# Patient Record
Sex: MALE | NOT HISPANIC OR LATINO
[De-identification: names, ages, dates, MRNs, and addresses within clinical notes are randomized per-mention and may not be internally consistent; named-entity substitution may affect disease eponyms.]

---
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
## 2025-02-20 ENCOUNTER — EMERGENCY (EMERGENCY)
Facility: HOSPITAL | Age: 20
LOS: 0 days | Discharge: HOME | End: 2025-02-20
Attending: STUDENT IN AN ORGANIZED HEALTH CARE EDUCATION/TRAINING PROGRAM
Payer: COMMERCIAL

## 2025-02-20 VITALS
WEIGHT: 179.9 LBS | DIASTOLIC BLOOD PRESSURE: 63 MMHG | HEART RATE: 59 BPM | TEMPERATURE: 99 F | SYSTOLIC BLOOD PRESSURE: 113 MMHG | OXYGEN SATURATION: 98 % | RESPIRATION RATE: 17 BRPM
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
## 2025-02-20 DIAGNOSIS — S80.01XA CONTUSION OF RIGHT KNEE, INITIAL ENCOUNTER: ICD-10-CM

## 2025-02-20 DIAGNOSIS — S80.221A BLISTER (NONTHERMAL), RIGHT KNEE, INITIAL ENCOUNTER: ICD-10-CM
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
## 2025-02-20 PROCEDURE — 99284 EMERGENCY DEPT VISIT MOD MDM: CPT

## 2025-02-20 PROCEDURE — 96372 THER/PROPH/DIAG INJ SC/IM: CPT

## 2025-02-20 PROCEDURE — 99283 EMERGENCY DEPT VISIT LOW MDM: CPT | Mod: 25

## 2025-02-20 PROCEDURE — 73562 X-RAY EXAM OF KNEE 3: CPT | Mod: LT

## 2025-02-20 PROCEDURE — 73562 X-RAY EXAM OF KNEE 3: CPT | Mod: 26,LT

## 2025-02-20 RX ORDER — METHOCARBAMOL 500 MG
2 TABLET ORAL
Qty: 12 | Refills: 0
Start: 2025-02-20 | End: 2025-02-21

## 2025-02-20 RX ORDER — IBUPROFEN 600 MG/1
1 TABLET, FILM COATED ORAL
Qty: 40 | Refills: 0
Start: 2025-02-20 | End: 2025-03-01

## 2025-02-20 RX ORDER — KETOROLAC TROMETHAMINE 10 MG
30 TABLET ORAL ONCE
Refills: 0 | Status: DISCONTINUED | OUTPATIENT
Start: 2025-02-20 | End: 2025-02-20

## 2025-02-20 RX ADMIN — Medication 30 MILLIGRAM(S): at 02:21

## 2025-02-20 NOTE — ED PROVIDER NOTE - PATIENT PORTAL LINK FT
You can access the FollowMyHealth Patient Portal offered by [facility] by registering at the following website: [email or web address]. By joining [facility]’s FollowMyHealth portal, you will also be able to view your health information using other applications (apps) compatible with our system.

## 2025-02-20 NOTE — ED PROVIDER NOTE - OBJECTIVE STATEMENT
20yoM without significant pmhx who presents for fall on left knee yesterday. States he was in an area with snow/ice and had mechanical fall landing on knee. Since then noticed yellowish/dark bruising to knee with pain. Has been icing the knee although noticed ice causing burn with blisters after application. Still can ambulate without difficulty. Denies any numbness, weakness, tingling, significant swelling.

## 2025-02-20 NOTE — ED ADULT NURSE NOTE - NSFALLUNIVINTERV_ED_ALL_ED
Bed/Stretcher in lowest position, wheels locked, appropriate side rails in place/Call bell, personal items and telephone in reach/Instruct patient to call for assistance before getting out of bed/chair/stretcher/Non-slip footwear applied when patient is off stretcher/[place] to call system/Physically safe environment - no spills, clutter or unnecessary equipment/Purposeful proactive rounding/Room/bathroom lighting operational, light cord in reach

## 2025-02-20 NOTE — ED PROVIDER NOTE - ATTENDING CONTRIBUTION TO CARE
19 yo m no pmh  pt here for eval of L knee pain. pt had slip and fall on L knee 3 days ago. no head/neck injury. pt still w/ pain w/ certain movements prompting eval. pt ambulatory.  pt applied cold pack to knee and developed cold contact burn.  no hip/ankle pain. no neck pain    vss  gen- NAD, aaox3  card-rrr  lungs-ctab, no wheezing or rhonchi  abd-sntnd, no guarding or rebound  neuro- full str/sensation, cn ii-xii grossly intact, normal coordination   msk- FROM to L hip/knee/ankle, no joint laxity to valgus/varus, ant/post drawer.  mild anterior knee tenderness, rectangular area of skin discoloration w/ mild blistering w/o surrounding redness

## 2025-02-20 NOTE — ED PROVIDER NOTE - NSFOLLOWUPINSTRUCTIONS_ED_ALL_ED_FT
Orthopedic referral been placed.  Our Emergency Department Referral Coordinators will be reaching out to you in the next 24-48 hours from 9:00am to 5:00pm with a follow up appointment. Please expect a phone call from the hospital in that time frame. If you do not receive a call or if you have any questions or concerns, you can reach them at   [phone]    Your x-ray of the knee is reassuring.    DISCHARGE INSTRUCTIONS:  Medicines:  •NSAIDs: These medicines decrease swelling and pain. Take as directed. NSAIDs can cause stomach bleeding and kidney problems if not taken correctly.    •Rest: You will need to rest your foot for 1 to 2 days after your injury. This will help decrease the risk of more damage.  •Ice: Ice helps decrease swelling and pain. Ice may also help prevent tissue damage. Use an ice pack, or put crushed ice in a plastic bag. Cover it with a towel and place it on your foot for 15 to 20 minutes every hour or as directed.  •Compression: Compression (tight hold) provides support and helps decrease swelling and movement so your foot can heal. You may be told to keep your foot wrapped with a tight elastic bandage. Follow instructions about how to apply your bandage. Do not massage your foot. You could cause more damage or pain.  •Elevation: Keep your foot raised above the level of your heart while you are sitting or lying down. This will help decrease or limit swelling. Use pillows, blankets, or rolled towels to elevate your foot comfortably.

## 2025-02-20 NOTE — ED PROVIDER NOTE - PHYSICAL EXAMINATION
Initial vital signs reviewed.  General: NAD, nontoxic appearing.  HENT: AT/NC.  Eyes: non-injected conjunctivae b/l.  Neck: supple.  CV: Regular rate.  Pulm: nonlabored work of breathing  Ext: right knee with mild ecchymosis, negative lachman, negative valgus/varus. right proximal tibia/anterior knee with superficial contact frost burn with small area of roofed blister, ttp. full aROM of knee. 2+ popliteal, PT, DP pulses. Ambulate steady gait.  Neuro: A&Ox4.  Psych: appropriate mood and affect.

## 2025-02-20 NOTE — ED PROVIDER NOTE - CLINICAL SUMMARY MEDICAL DECISION MAKING FREE TEXT BOX
Throughout ED observation period, pt remained clinically and hemodynamically stable.  Imaging without acute fracture or dislocation.  Juan PERKINS- ED Attending, interpreted the -knee-xray: no fx or dislocation  Home care discussed- MARY KAPOOR pain management, need for follow up with ortho  indications to return to ED (increasing pain, swelling, redness, numbness, tingling)  Pt had opportunity to ask questions

## 2025-02-22 ENCOUNTER — APPOINTMENT (OUTPATIENT)
Dept: ORTHOPEDIC SURGERY | Facility: CLINIC | Age: 20
End: 2025-02-22
Payer: COMMERCIAL

## 2025-02-22 VITALS — HEIGHT: 68 IN | WEIGHT: 180 LBS | BODY MASS INDEX: 27.28 KG/M2

## 2025-02-22 DIAGNOSIS — Z78.9 OTHER SPECIFIED HEALTH STATUS: ICD-10-CM

## 2025-02-22 DIAGNOSIS — S80.02XA CONTUSION OF LEFT KNEE, INITIAL ENCOUNTER: ICD-10-CM

## 2025-02-22 PROCEDURE — 99203 OFFICE O/P NEW LOW 30 MIN: CPT

## 2025-03-04 ENCOUNTER — APPOINTMENT (OUTPATIENT)
Dept: MRI IMAGING | Facility: CLINIC | Age: 20
End: 2025-03-04

## 2025-03-17 ENCOUNTER — APPOINTMENT (OUTPATIENT)
Dept: ORTHOPEDIC SURGERY | Facility: CLINIC | Age: 20
End: 2025-03-17